# Patient Record
Sex: FEMALE | Race: WHITE | NOT HISPANIC OR LATINO | URBAN - METROPOLITAN AREA
[De-identification: names, ages, dates, MRNs, and addresses within clinical notes are randomized per-mention and may not be internally consistent; named-entity substitution may affect disease eponyms.]

---

## 2017-11-10 ENCOUNTER — COMMUNICATION - RIVER FALLS (OUTPATIENT)
Dept: FAMILY MEDICINE | Facility: CLINIC | Age: 22
End: 2017-11-10

## 2017-11-10 ENCOUNTER — OFFICE VISIT - RIVER FALLS (OUTPATIENT)
Dept: FAMILY MEDICINE | Facility: CLINIC | Age: 22
End: 2017-11-10

## 2022-02-12 VITALS
HEART RATE: 77 BPM | DIASTOLIC BLOOD PRESSURE: 77 MMHG | BODY MASS INDEX: 24.27 KG/M2 | SYSTOLIC BLOOD PRESSURE: 128 MMHG | TEMPERATURE: 97.4 F | WEIGHT: 159.6 LBS

## 2022-02-15 NOTE — PROGRESS NOTES
Patient:   JEFF MATTSON            MRN: [id number]            FIN: [id number]               Age:   22 years     Sex:  Female     :  1995   Associated Diagnoses:   Cold   Author:   Adam Leach MD      Visit Information      Primary Care Provider (PCP):  NONE ,       Chief Complaint   11/10/2017 9:43 AM CST   Nausea, headache, chills, vomiting. Sore throat x3 days.     Upper Respiratory Symptoms      History of Present Illness             The patient presents with symptoms of an upper respiratory infection.  The symptoms of the upper respiratory infection are described as rhinorrhea, sore throat, nasal congestion, cough and no sputum production.  The severity of the symptoms associated to the upper respiratory infection is moderate.  The timing/course of upper respiratory infection symptoms fluctuates in intensity.  The symptoms of upper respiratory infection have lasted for 2 day(s).  Exacerbating factors consist of none.  Relieving factors consist of none.  Associated symptoms consist of chills and denies fever.        Review of Systems   Constitutional:  Fatigue.    Ear/Nose/Mouth/Throat:  Nasal congestion.    Respiratory:  Cough, No shortness of breath, No sputum production, No wheezing.    Integumentary:  No rash.             Health Status   Allergies:    Allergic Reactions (Selected)  No Known Medication Allergies   Medications:  (Selected)   Documented Medications  Documented  Ortho Tri-Cyclen: 1 tab(s), po, daily, 0 Refill(s), Type: Maintenance   Problem list:    No problem items selected or recorded.      Histories   Past Medical History:    No active or resolved past medical history items have been selected or recorded.   Family History:       Procedure history:    Extraction of wisdom tooth (SNOMED CT [id number]).   Social History:        Alcohol Assessment            Never      Tobacco Assessment            Never smoker      Substance Abuse Assessment            Never      Employment and  Education Assessment            Student                     Comments:                      11/07/2015 - Kalyan GUTIÉRREZJean                     Sophomore at [employer]  ,        Alcohol Assessment            Never      Tobacco Assessment            Never smoker      Substance Abuse Assessment            Never      Employment and Education Assessment            Student                     Comments:                      11/07/2015 - Kalyan Jean GUTIÉRREZ                     Sophomore at [employer]        Physical Examination   Vital Signs   11/10/2017 9:43 AM CST Temperature Tympanic 97.4 DegF  LOW    Peripheral Pulse Rate 77 bpm    HR Method Electronic    Systolic Blood Pressure 128 mmHg    Diastolic Blood Pressure 77 mmHg    Mean Arterial Pressure 94 mmHg    BP Method Electronic      Measurements from flowsheet : Measurements   11/10/2017 9:43 AM CST   Weight Measured - Standard                159.6 lb     General:  Alert and oriented, No acute distress.    Eye:  Normal conjunctiva.    HENT:  Normocephalic, Tympanic membranes are clear, Oral mucosa is moist.         Throat: Tonsils ( Enlarged ), Pharynx ( Erythematous, No exudate ).    Neck:  Supple, Non-tender, No lymphadenopathy.    Respiratory:  Lungs are clear to auscultation, Breath sounds are equal, Symmetrical chest wall expansion.         Respirations: Are within normal limits.         Pattern: Regular.         Breath sounds: Bilateral, Within normal limits.    Cardiovascular:  Normal rate, Regular rhythm, No murmur, Good pulses equal in all extremities, Normal peripheral perfusion, No edema.    Gastrointestinal:  Soft, Non-tender.    Integumentary:  Warm, No rash.    Neurologic:  Alert, Oriented.    Psychiatric:  Cooperative, Appropriate mood & affect.       Review / Management   Course:  Progressing as expected.       Impression and Plan   Diagnosis     Cold (CPO75-XT J00).     Course:  Progressing as expected.    Orders     Return to clinic or ER if no improvements or  worsening signs symptoms.     Symptomatic care guidelines reviewed.     Counseled:  Regarding diagnosis (Reviewed the viral nature of this illness and recommended rest and fluids. Discussed the natural history of viral URI, anticipatory guidance).    Patient Instructions:  Launch follow-up (if licensed).